# Patient Record
Sex: FEMALE | Race: BLACK OR AFRICAN AMERICAN | NOT HISPANIC OR LATINO | Employment: FULL TIME | URBAN - METROPOLITAN AREA
[De-identification: names, ages, dates, MRNs, and addresses within clinical notes are randomized per-mention and may not be internally consistent; named-entity substitution may affect disease eponyms.]

---

## 2024-02-29 ENCOUNTER — HOSPITAL ENCOUNTER (EMERGENCY)
Facility: HOSPITAL | Age: 29
Discharge: HOME | End: 2024-02-29
Attending: STUDENT IN AN ORGANIZED HEALTH CARE EDUCATION/TRAINING PROGRAM
Payer: COMMERCIAL

## 2024-02-29 ENCOUNTER — APPOINTMENT (OUTPATIENT)
Dept: RADIOLOGY | Facility: HOSPITAL | Age: 29
End: 2024-02-29
Payer: COMMERCIAL

## 2024-02-29 VITALS
SYSTOLIC BLOOD PRESSURE: 139 MMHG | BODY MASS INDEX: 35.32 KG/M2 | WEIGHT: 212 LBS | RESPIRATION RATE: 16 BRPM | TEMPERATURE: 99.9 F | HEIGHT: 65 IN | DIASTOLIC BLOOD PRESSURE: 93 MMHG | HEART RATE: 92 BPM | OXYGEN SATURATION: 99 %

## 2024-02-29 DIAGNOSIS — N93.9 VAGINAL BLEEDING: Primary | ICD-10-CM

## 2024-02-29 LAB
ABO GROUP (TYPE) IN BLOOD: NORMAL
ALBUMIN SERPL-MCNC: 4 G/DL (ref 3.5–5)
ALP BLD-CCNC: 80 U/L (ref 35–125)
ALT SERPL-CCNC: 7 U/L (ref 5–40)
ANION GAP SERPL CALC-SCNC: 11 MMOL/L
ANTIBODY SCREEN: NORMAL
AST SERPL-CCNC: 12 U/L (ref 5–40)
BASOPHILS # BLD AUTO: 0.04 X10*3/UL (ref 0–0.1)
BASOPHILS NFR BLD AUTO: 0.5 %
BILIRUB SERPL-MCNC: 0.6 MG/DL (ref 0.1–1.2)
BUN SERPL-MCNC: 7 MG/DL (ref 8–25)
CALCIUM SERPL-MCNC: 8.9 MG/DL (ref 8.5–10.4)
CHLORIDE SERPL-SCNC: 105 MMOL/L (ref 97–107)
CO2 SERPL-SCNC: 22 MMOL/L (ref 24–31)
CREAT SERPL-MCNC: 0.6 MG/DL (ref 0.4–1.6)
EGFRCR SERPLBLD CKD-EPI 2021: >90 ML/MIN/1.73M*2
EOSINOPHIL # BLD AUTO: 0.05 X10*3/UL (ref 0–0.7)
EOSINOPHIL NFR BLD AUTO: 0.6 %
ERYTHROCYTE [DISTWIDTH] IN BLOOD BY AUTOMATED COUNT: 13.8 % (ref 11.5–14.5)
GLUCOSE SERPL-MCNC: 82 MG/DL (ref 65–99)
HCG SERPL-ACNC: 6 MIU/ML
HCT VFR BLD AUTO: 37.5 % (ref 36–46)
HGB BLD-MCNC: 12.1 G/DL (ref 12–16)
IMM GRANULOCYTES # BLD AUTO: 0.02 X10*3/UL (ref 0–0.7)
IMM GRANULOCYTES NFR BLD AUTO: 0.3 % (ref 0–0.9)
LYMPHOCYTES # BLD AUTO: 2.68 X10*3/UL (ref 1.2–4.8)
LYMPHOCYTES NFR BLD AUTO: 34.6 %
MCH RBC QN AUTO: 28.5 PG (ref 26–34)
MCHC RBC AUTO-ENTMCNC: 32.3 G/DL (ref 32–36)
MCV RBC AUTO: 88 FL (ref 80–100)
MONOCYTES # BLD AUTO: 0.58 X10*3/UL (ref 0.1–1)
MONOCYTES NFR BLD AUTO: 7.5 %
NEUTROPHILS # BLD AUTO: 4.38 X10*3/UL (ref 1.2–7.7)
NEUTROPHILS NFR BLD AUTO: 56.5 %
NRBC BLD-RTO: 0 /100 WBCS (ref 0–0)
PLATELET # BLD AUTO: 374 X10*3/UL (ref 150–450)
POTASSIUM SERPL-SCNC: 4 MMOL/L (ref 3.4–5.1)
PROT SERPL-MCNC: 7.2 G/DL (ref 5.9–7.9)
RBC # BLD AUTO: 4.24 X10*6/UL (ref 4–5.2)
RH FACTOR (ANTIGEN D): NORMAL
SODIUM SERPL-SCNC: 138 MMOL/L (ref 133–145)
WBC # BLD AUTO: 7.8 X10*3/UL (ref 4.4–11.3)

## 2024-02-29 PROCEDURE — 96374 THER/PROPH/DIAG INJ IV PUSH: CPT | Performed by: STUDENT IN AN ORGANIZED HEALTH CARE EDUCATION/TRAINING PROGRAM

## 2024-02-29 PROCEDURE — 76857 US EXAM PELVIC LIMITED: CPT | Performed by: RADIOLOGY

## 2024-02-29 PROCEDURE — 84702 CHORIONIC GONADOTROPIN TEST: CPT | Performed by: PHYSICIAN ASSISTANT

## 2024-02-29 PROCEDURE — 86901 BLOOD TYPING SEROLOGIC RH(D): CPT | Performed by: PHYSICIAN ASSISTANT

## 2024-02-29 PROCEDURE — 36415 COLL VENOUS BLD VENIPUNCTURE: CPT | Performed by: PHYSICIAN ASSISTANT

## 2024-02-29 PROCEDURE — 76856 US EXAM PELVIC COMPLETE: CPT

## 2024-02-29 PROCEDURE — 76830 TRANSVAGINAL US NON-OB: CPT | Performed by: RADIOLOGY

## 2024-02-29 PROCEDURE — 80053 COMPREHEN METABOLIC PANEL: CPT | Performed by: PHYSICIAN ASSISTANT

## 2024-02-29 PROCEDURE — 99284 EMERGENCY DEPT VISIT MOD MDM: CPT | Mod: 25 | Performed by: STUDENT IN AN ORGANIZED HEALTH CARE EDUCATION/TRAINING PROGRAM

## 2024-02-29 PROCEDURE — 2500000004 HC RX 250 GENERAL PHARMACY W/ HCPCS (ALT 636 FOR OP/ED): Performed by: STUDENT IN AN ORGANIZED HEALTH CARE EDUCATION/TRAINING PROGRAM

## 2024-02-29 PROCEDURE — 85025 COMPLETE CBC W/AUTO DIFF WBC: CPT | Performed by: PHYSICIAN ASSISTANT

## 2024-02-29 RX ORDER — KETOROLAC TROMETHAMINE 30 MG/ML
15 INJECTION, SOLUTION INTRAMUSCULAR; INTRAVENOUS ONCE
Status: COMPLETED | OUTPATIENT
Start: 2024-02-29 | End: 2024-02-29

## 2024-02-29 RX ORDER — MEDROXYPROGESTERONE ACETATE 10 MG/1
10 TABLET ORAL DAILY
Qty: 14 TABLET | Refills: 0 | Status: SHIPPED | OUTPATIENT
Start: 2024-02-29 | End: 2024-03-14

## 2024-02-29 RX ADMIN — KETOROLAC TROMETHAMINE 15 MG: 30 INJECTION INTRAMUSCULAR; INTRAVENOUS at 21:39

## 2024-02-29 ASSESSMENT — PAIN SCALES - GENERAL: PAINLEVEL_OUTOF10: 6

## 2024-02-29 ASSESSMENT — LIFESTYLE VARIABLES
EVER HAD A DRINK FIRST THING IN THE MORNING TO STEADY YOUR NERVES TO GET RID OF A HANGOVER: NO
HAVE PEOPLE ANNOYED YOU BY CRITICIZING YOUR DRINKING: NO
HAVE YOU EVER FELT YOU SHOULD CUT DOWN ON YOUR DRINKING: NO
EVER FELT BAD OR GUILTY ABOUT YOUR DRINKING: NO

## 2024-02-29 ASSESSMENT — COLUMBIA-SUICIDE SEVERITY RATING SCALE - C-SSRS
2. HAVE YOU ACTUALLY HAD ANY THOUGHTS OF KILLING YOURSELF?: NO
6. HAVE YOU EVER DONE ANYTHING, STARTED TO DO ANYTHING, OR PREPARED TO DO ANYTHING TO END YOUR LIFE?: NO
1. IN THE PAST MONTH, HAVE YOU WISHED YOU WERE DEAD OR WISHED YOU COULD GO TO SLEEP AND NOT WAKE UP?: NO

## 2024-02-29 ASSESSMENT — PAIN DESCRIPTION - PAIN TYPE: TYPE: ACUTE PAIN

## 2024-02-29 ASSESSMENT — PAIN - FUNCTIONAL ASSESSMENT: PAIN_FUNCTIONAL_ASSESSMENT: 0-10

## 2024-02-29 ASSESSMENT — PAIN DESCRIPTION - LOCATION: LOCATION: ABDOMEN

## 2024-03-01 NOTE — ED NOTES
Patient presents to the ER reports vaginal bleeding since 1500t sanjiv, she had an elective termination of pregnancy a month ago.  She reports clots the size of apples and feels dizzy.        PMHX:  No past medical history on file.     No Known Allergies      LABS:    Latest Reference Range & Units 02/29/24 19:57   ANTIBODY SCREEN  NEG   ABO TYPE  A   Rh Type  POS   GLUCOSE 65 - 99 mg/dL 82   SODIUM 133 - 145 mmol/L 138   POTASSIUM 3.4 - 5.1 mmol/L 4.0   CHLORIDE 97 - 107 mmol/L 105   Bicarbonate 24 - 31 mmol/L 22 (L)   Anion Gap <=19 mmol/L 11   Blood Urea Nitrogen 8 - 25 mg/dL 7 (L)   Creatinine 0.40 - 1.60 mg/dL 0.60   EGFR >60 mL/min/1.73m*2 >90   Calcium 8.5 - 10.4 mg/dL 8.9   Albumin 3.5 - 5.0 g/dL 4.0   Alkaline Phosphatase 35 - 125 U/L 80   ALT 5 - 40 U/L 7   AST 5 - 40 U/L 12   Bilirubin Total 0.1 - 1.2 mg/dL 0.6   Total Protein 5.9 - 7.9 g/dL 7.2   HCG, Beta-Quantitative SEE COMMENT BELOW mIU/mL 6   WBC 4.4 - 11.3 x10*3/uL 7.8   nRBC 0.0 - 0.0 /100 WBCs 0.0   RBC 4.00 - 5.20 x10*6/uL 4.24   HEMOGLOBIN 12.0 - 16.0 g/dL 12.1   HEMATOCRIT 36.0 - 46.0 % 37.5   MCV 80 - 100 fL 88   MCH 26.0 - 34.0 pg 28.5   MCHC 32.0 - 36.0 g/dL 32.3   RED CELL DISTRIBUTION WIDTH 11.5 - 14.5 % 13.8   Platelets 150 - 450 x10*3/uL 374   (L): Data is abnormally low        PLAN: Pending                   Debbie Jefferson, BO  02/29/24 2035       Debbie Jefferson RN  02/29/24 2136

## 2024-03-01 NOTE — ED PROVIDER NOTES
HPI   Chief Complaint   Patient presents with    Abdominal Pain    Vaginal Bleeding - Pregnant       28-year-old female presented emergency department with a chief complaint of vaginal bleeding.  She states this has been ongoing intermittently for the last 3 weeks.  She had a  with a D&C 3 weeks ago at an outside facility.  She does not have an established OB/GYN she follows with.  She denies vaginal discharge.  She is having some minimal suprapubic discomfort.  She denies dysuria.  She denies anticoagulant.  She denies chest pain or pressure.  No other complaint.                          Esthela Coma Scale Score: 15                     Patient History   No past medical history on file.  No past surgical history on file.  No family history on file.  Social History     Tobacco Use    Smoking status: Not on file    Smokeless tobacco: Not on file   Substance Use Topics    Alcohol use: Not on file    Drug use: Not on file       Physical Exam   ED Triage Vitals [24 1859]   Temperature Heart Rate Respirations BP   37.7 °C (99.9 °F) 84 18 (!) 124/96      Pulse Ox Temp Source Heart Rate Source Patient Position   100 % Temporal Monitor Sitting      BP Location FiO2 (%)     Left arm --       Physical Exam  Vitals and nursing note reviewed.   Constitutional:       Appearance: She is well-developed.   HENT:      Head: Normocephalic.   Cardiovascular:      Rate and Rhythm: Normal rate and regular rhythm.      Heart sounds: Normal heart sounds.   Pulmonary:      Effort: Pulmonary effort is normal.   Abdominal:      General: Abdomen is flat.      Palpations: Abdomen is soft.   Neurological:      General: No focal deficit present.      Mental Status: She is alert and oriented to person, place, and time.   Psychiatric:         Mood and Affect: Mood normal.         ED Course & MDM   Diagnoses as of 24 2241   Vaginal bleeding       Medical Decision Making  I have seen and evaluated this patient.  Physician  available for consultation.  Vital signs have been reviewed.  All laboratory and diagnostic imaging is reviewed by myself and interpreted by myself unless otherwise stated.  Additionally imaging is interpreted by radiologist.    CBC without significant leukocytosis or anemia, metabolic panel without significant renal impairment or electrolyte abnormality.  Ultrasound without suggestion of retained products of misconception.  hCG quantitative is 6.  Case was discussed with Dr. Draper from OB/GYN who says there is a very low chance that this represents products misconception and recommends no treatment versus initiation of progesterone.  Patient prefers to be treated with progesterone.  Released in good condition with OB/GYN follow-up.      Labs Reviewed   COMPREHENSIVE METABOLIC PANEL - Abnormal       Result Value    Glucose 82      Sodium 138      Potassium 4.0      Chloride 105      Bicarbonate 22 (*)     Urea Nitrogen 7 (*)     Creatinine 0.60      eGFR >90      Calcium 8.9      Albumin 4.0      Alkaline Phosphatase 80      Total Protein 7.2      AST 12      Bilirubin, Total 0.6      ALT 7      Anion Gap 11     CBC WITH AUTO DIFFERENTIAL    WBC 7.8      nRBC 0.0      RBC 4.24      Hemoglobin 12.1      Hematocrit 37.5      MCV 88      MCH 28.5      MCHC 32.3      RDW 13.8      Platelets 374      Neutrophils % 56.5      Immature Granulocytes %, Automated 0.3      Lymphocytes % 34.6      Monocytes % 7.5      Eosinophils % 0.6      Basophils % 0.5      Neutrophils Absolute 4.38      Immature Granulocytes Absolute, Automated 0.02      Lymphocytes Absolute 2.68      Monocytes Absolute 0.58      Eosinophils Absolute 0.05      Basophils Absolute 0.04     HUMAN CHORIONIC GONADOTROPIN, SERUM QUANTITATIVE    HCG, Beta-Quantitative 6     TYPE AND SCREEN    ABO TYPE A      Rh TYPE POS      ANTIBODY SCREEN NEG     URINALYSIS WITH REFLEX CULTURE AND MICROSCOPIC    Narrative:     The following orders were created for panel order  Urinalysis with Reflex Culture and Microscopic.  Procedure                               Abnormality         Status                     ---------                               -----------         ------                     Urinalysis with Reflex C...[[id number]]                                                 Extra Urine Gray Tube[[id number]]                                                         Please view results for these tests on the individual orders.   HCG, URINE, QUALITATIVE   URINALYSIS WITH REFLEX CULTURE AND MICROSCOPIC   EXTRA URINE GRAY TUBE     US PELVIS TRANSABDOMINAL WITH TRANSVAGINAL   Final Result   A small amount of fluid is present within the endometrial canal and   cervix. No definite associated vascularity is appreciated. Evaluation   is limited due to patient discomfort with probe pressure. Recommend   continued follow-up with serial beta-hCG values.        MACRO:   None.        Signed by: Evan Finkelstein 2/29/2024 9:58 PM   Dictation workstation:   [id number]        Medications   ketorolac (Toradol) injection 15 mg (15 mg intravenous Given 2/29/24 2139)     Discharge Medication List as of 2/29/2024 10:41 PM        START taking these medications    Details   medroxyPROGESTERone (Provera) 10 mg tablet Take 1 tablet (10 mg) by mouth once daily for 14 doses., Starting Thu 2/29/2024, Until Thu 3/14/2024, Print               Procedure  Procedures     Agustin Ahumada PA-C  03/01/24 0156

## 2024-03-01 NOTE — ED PROVIDER NOTES
I staffed this patient with an FELICIA during their work-up present and participated in a substantive of portion of the management of this patient. I have reviewed and agree with their documentation.      This is a 28-year-old female presenting to ED for evaluation of vaginal bleeding and lower abdominal pain.  This been ongoing for about 3 weeks.  She had an  with D&C about 3 weeks ago and states that she has been having persistent symptoms since then.  She denies any fevers, nausea or vomiting, she does not have an OB/GYN to follow-up with after having this procedure done.  She denies any dysuria or urinary frequency.  Pain is in the midline in the suprapubic area.  Physical exam shows tenderness without peritoneal signs.    Laboratory testing is unremarkable here.  hCG is 6, there is no leukocytosis suggestive of severe infectious process, blood type is a positive, no indication for RhoGAM.  She has no urinary symptoms suggestive of UTI.  No vaginal discharge or concern for STIs.  She was at another hospital earlier today but left because she was waiting too long to get her ultrasound done.    Ultrasound was performed here showing no evidence of any retained products of conception or acute abnormality.  There is a small amount of fluid in the endometrial canal and cervix.    I saw this patient with AIXA Ahumada.  He discussed the case with the OB/GYN on-call who reviewed the ultrasound and recommends discharge and outpatient follow-up with or without progesterone hormone medication.  He had the discussion of whether or not the patient wanted this medication to treat the bleeding before discharge.    Physical Exam  General: well developed, well nourished, in no apparent distress  CV: regular rate and rhythm, no murmur, no gallops, or rubs.   Resp: clear to ascultation bilaterally, no wheezes, rales, or rhonchi  GI: abdomen soft, tender to palpation in the suprapubic area without rigidity or guarding, no  peritoneal signs, abdomen is nondistended, no masses palpated  Psych: appropriate mood and affect, cooperative with exam  Skin: warm, dry, without evidence of rash or abrasions       Odin Kyle,   03/01/24 0927